# Patient Record
Sex: FEMALE | Race: WHITE | NOT HISPANIC OR LATINO | Employment: FULL TIME | URBAN - METROPOLITAN AREA
[De-identification: names, ages, dates, MRNs, and addresses within clinical notes are randomized per-mention and may not be internally consistent; named-entity substitution may affect disease eponyms.]

---

## 2021-11-10 ENCOUNTER — LAB VISIT (OUTPATIENT)
Dept: LAB | Facility: HOSPITAL | Age: 31
End: 2021-11-10
Attending: FAMILY MEDICINE
Payer: COMMERCIAL

## 2021-11-10 ENCOUNTER — OFFICE VISIT (OUTPATIENT)
Dept: INTERNAL MEDICINE | Facility: CLINIC | Age: 31
End: 2021-11-10
Payer: COMMERCIAL

## 2021-11-10 VITALS
SYSTOLIC BLOOD PRESSURE: 130 MMHG | TEMPERATURE: 98 F | BODY MASS INDEX: 28.23 KG/M2 | HEIGHT: 66 IN | WEIGHT: 175.69 LBS | DIASTOLIC BLOOD PRESSURE: 84 MMHG | HEART RATE: 64 BPM

## 2021-11-10 DIAGNOSIS — Z00.00 ANNUAL PHYSICAL EXAM: Primary | ICD-10-CM

## 2021-11-10 DIAGNOSIS — Z00.00 ANNUAL PHYSICAL EXAM: ICD-10-CM

## 2021-11-10 LAB
BASOPHILS # BLD AUTO: 0.03 K/UL (ref 0–0.2)
BASOPHILS NFR BLD: 0.5 % (ref 0–1.9)
DIFFERENTIAL METHOD: NORMAL
EOSINOPHIL # BLD AUTO: 0.1 K/UL (ref 0–0.5)
EOSINOPHIL NFR BLD: 1.7 % (ref 0–8)
ERYTHROCYTE [DISTWIDTH] IN BLOOD BY AUTOMATED COUNT: 12.5 % (ref 11.5–14.5)
ESTIMATED AVG GLUCOSE: 94 MG/DL (ref 68–131)
HBA1C MFR BLD: 4.9 % (ref 4–5.6)
HCT VFR BLD AUTO: 38.7 % (ref 37–48.5)
HGB BLD-MCNC: 12.8 G/DL (ref 12–16)
IMM GRANULOCYTES # BLD AUTO: 0.02 K/UL (ref 0–0.04)
IMM GRANULOCYTES NFR BLD AUTO: 0.3 % (ref 0–0.5)
LYMPHOCYTES # BLD AUTO: 2.9 K/UL (ref 1–4.8)
LYMPHOCYTES NFR BLD: 44.5 % (ref 18–48)
MCH RBC QN AUTO: 30.8 PG (ref 27–31)
MCHC RBC AUTO-ENTMCNC: 33.1 G/DL (ref 32–36)
MCV RBC AUTO: 93 FL (ref 82–98)
MONOCYTES # BLD AUTO: 0.6 K/UL (ref 0.3–1)
MONOCYTES NFR BLD: 8.6 % (ref 4–15)
NEUTROPHILS # BLD AUTO: 2.9 K/UL (ref 1.8–7.7)
NEUTROPHILS NFR BLD: 44.4 % (ref 38–73)
NRBC BLD-RTO: 0 /100 WBC
PLATELET # BLD AUTO: 283 K/UL (ref 150–450)
PMV BLD AUTO: 11.4 FL (ref 9.2–12.9)
RBC # BLD AUTO: 4.16 M/UL (ref 4–5.4)
WBC # BLD AUTO: 6.54 K/UL (ref 3.9–12.7)

## 2021-11-10 PROCEDURE — 90715 TDAP VACCINE GREATER THAN OR EQUAL TO 7YO IM: ICD-10-PCS | Mod: S$GLB,,, | Performed by: FAMILY MEDICINE

## 2021-11-10 PROCEDURE — 84443 ASSAY THYROID STIM HORMONE: CPT | Performed by: FAMILY MEDICINE

## 2021-11-10 PROCEDURE — 80053 COMPREHEN METABOLIC PANEL: CPT | Performed by: FAMILY MEDICINE

## 2021-11-10 PROCEDURE — 1159F MED LIST DOCD IN RCRD: CPT | Mod: CPTII,S$GLB,, | Performed by: FAMILY MEDICINE

## 2021-11-10 PROCEDURE — 99999 PR PBB SHADOW E&M-NEW PATIENT-LVL III: CPT | Mod: PBBFAC,,, | Performed by: FAMILY MEDICINE

## 2021-11-10 PROCEDURE — 85025 COMPLETE CBC W/AUTO DIFF WBC: CPT | Performed by: FAMILY MEDICINE

## 2021-11-10 PROCEDURE — 90471 IMMUNIZATION ADMIN: CPT | Mod: S$GLB,,, | Performed by: FAMILY MEDICINE

## 2021-11-10 PROCEDURE — 99385 PR PREVENTIVE VISIT,NEW,18-39: ICD-10-PCS | Mod: 25,S$GLB,, | Performed by: FAMILY MEDICINE

## 2021-11-10 PROCEDURE — 86803 HEPATITIS C AB TEST: CPT | Performed by: FAMILY MEDICINE

## 2021-11-10 PROCEDURE — 36415 COLL VENOUS BLD VENIPUNCTURE: CPT | Mod: PO | Performed by: FAMILY MEDICINE

## 2021-11-10 PROCEDURE — 99385 PREV VISIT NEW AGE 18-39: CPT | Mod: 25,S$GLB,, | Performed by: FAMILY MEDICINE

## 2021-11-10 PROCEDURE — 99999 PR PBB SHADOW E&M-NEW PATIENT-LVL III: ICD-10-PCS | Mod: PBBFAC,,, | Performed by: FAMILY MEDICINE

## 2021-11-10 PROCEDURE — 84466 ASSAY OF TRANSFERRIN: CPT | Performed by: FAMILY MEDICINE

## 2021-11-10 PROCEDURE — 1159F PR MEDICATION LIST DOCUMENTED IN MEDICAL RECORD: ICD-10-PCS | Mod: CPTII,S$GLB,, | Performed by: FAMILY MEDICINE

## 2021-11-10 PROCEDURE — 82728 ASSAY OF FERRITIN: CPT | Performed by: FAMILY MEDICINE

## 2021-11-10 PROCEDURE — 3075F PR MOST RECENT SYSTOLIC BLOOD PRESS GE 130-139MM HG: ICD-10-PCS | Mod: CPTII,S$GLB,, | Performed by: FAMILY MEDICINE

## 2021-11-10 PROCEDURE — 3075F SYST BP GE 130 - 139MM HG: CPT | Mod: CPTII,S$GLB,, | Performed by: FAMILY MEDICINE

## 2021-11-10 PROCEDURE — 90471 TDAP VACCINE GREATER THAN OR EQUAL TO 7YO IM: ICD-10-PCS | Mod: S$GLB,,, | Performed by: FAMILY MEDICINE

## 2021-11-10 PROCEDURE — 3008F PR BODY MASS INDEX (BMI) DOCUMENTED: ICD-10-PCS | Mod: CPTII,S$GLB,, | Performed by: FAMILY MEDICINE

## 2021-11-10 PROCEDURE — 3079F DIAST BP 80-89 MM HG: CPT | Mod: CPTII,S$GLB,, | Performed by: FAMILY MEDICINE

## 2021-11-10 PROCEDURE — 83525 ASSAY OF INSULIN: CPT | Performed by: FAMILY MEDICINE

## 2021-11-10 PROCEDURE — 3079F PR MOST RECENT DIASTOLIC BLOOD PRESSURE 80-89 MM HG: ICD-10-PCS | Mod: CPTII,S$GLB,, | Performed by: FAMILY MEDICINE

## 2021-11-10 PROCEDURE — 87389 HIV-1 AG W/HIV-1&-2 AB AG IA: CPT | Performed by: FAMILY MEDICINE

## 2021-11-10 PROCEDURE — 3008F BODY MASS INDEX DOCD: CPT | Mod: CPTII,S$GLB,, | Performed by: FAMILY MEDICINE

## 2021-11-10 PROCEDURE — 83036 HEMOGLOBIN GLYCOSYLATED A1C: CPT | Performed by: FAMILY MEDICINE

## 2021-11-10 PROCEDURE — 90715 TDAP VACCINE 7 YRS/> IM: CPT | Mod: S$GLB,,, | Performed by: FAMILY MEDICINE

## 2021-11-10 PROCEDURE — 80061 LIPID PANEL: CPT | Performed by: FAMILY MEDICINE

## 2021-11-10 RX ORDER — ZINC SULFATE 66 MG
1 TABLET ORAL DAILY
COMMUNITY

## 2021-11-10 RX ORDER — IBUPROFEN 200 MG
200 TABLET ORAL EVERY 6 HOURS PRN
COMMUNITY

## 2021-11-10 RX ORDER — AMOXICILLIN 500 MG
CAPSULE ORAL DAILY
COMMUNITY

## 2021-11-10 RX ORDER — FERROUS SULFATE 325(65) MG
325 TABLET, DELAYED RELEASE (ENTERIC COATED) ORAL DAILY
COMMUNITY

## 2021-11-10 RX ORDER — MULTIVITAMIN
1 TABLET ORAL DAILY
COMMUNITY

## 2021-11-10 RX ORDER — UBIDECARENONE 75 MG
500 CAPSULE ORAL DAILY
COMMUNITY

## 2021-11-11 LAB
ALBUMIN SERPL BCP-MCNC: 4.4 G/DL (ref 3.5–5.2)
ALP SERPL-CCNC: 53 U/L (ref 55–135)
ALT SERPL W/O P-5'-P-CCNC: 21 U/L (ref 10–44)
ANION GAP SERPL CALC-SCNC: 9 MMOL/L (ref 8–16)
AST SERPL-CCNC: 30 U/L (ref 10–40)
BILIRUB SERPL-MCNC: 0.6 MG/DL (ref 0.1–1)
BUN SERPL-MCNC: 18 MG/DL (ref 6–20)
CALCIUM SERPL-MCNC: 10.2 MG/DL (ref 8.7–10.5)
CHLORIDE SERPL-SCNC: 108 MMOL/L (ref 95–110)
CHOLEST SERPL-MCNC: 268 MG/DL (ref 120–199)
CHOLEST/HDLC SERPL: 4.3 {RATIO} (ref 2–5)
CO2 SERPL-SCNC: 23 MMOL/L (ref 23–29)
CREAT SERPL-MCNC: 0.8 MG/DL (ref 0.5–1.4)
EST. GFR  (AFRICAN AMERICAN): >60 ML/MIN/1.73 M^2
EST. GFR  (NON AFRICAN AMERICAN): >60 ML/MIN/1.73 M^2
FERRITIN SERPL-MCNC: 130 NG/ML (ref 20–300)
GLUCOSE SERPL-MCNC: 84 MG/DL (ref 70–110)
HCV AB SERPL QL IA: NEGATIVE
HDLC SERPL-MCNC: 62 MG/DL (ref 40–75)
HDLC SERPL: 23.1 % (ref 20–50)
HIV 1+2 AB+HIV1 P24 AG SERPL QL IA: NEGATIVE
INSULIN COLLECTION INTERVAL: NORMAL
INSULIN SERPL-ACNC: 4.7 UU/ML
IRON SERPL-MCNC: 145 UG/DL (ref 30–160)
LDLC SERPL CALC-MCNC: 188.2 MG/DL (ref 63–159)
NONHDLC SERPL-MCNC: 206 MG/DL
POTASSIUM SERPL-SCNC: 4.6 MMOL/L (ref 3.5–5.1)
PROT SERPL-MCNC: 8 G/DL (ref 6–8.4)
SATURATED IRON: 38 % (ref 20–50)
SODIUM SERPL-SCNC: 140 MMOL/L (ref 136–145)
TOTAL IRON BINDING CAPACITY: 385 UG/DL (ref 250–450)
TRANSFERRIN SERPL-MCNC: 260 MG/DL (ref 200–375)
TRIGL SERPL-MCNC: 89 MG/DL (ref 30–150)
TSH SERPL DL<=0.005 MIU/L-ACNC: 2.08 UIU/ML (ref 0.4–4)

## 2021-12-06 ENCOUNTER — OFFICE VISIT (OUTPATIENT)
Dept: URGENT CARE | Facility: CLINIC | Age: 31
End: 2021-12-06
Payer: COMMERCIAL

## 2021-12-06 VITALS
HEART RATE: 61 BPM | DIASTOLIC BLOOD PRESSURE: 61 MMHG | BODY MASS INDEX: 28.12 KG/M2 | HEIGHT: 66 IN | TEMPERATURE: 98 F | RESPIRATION RATE: 16 BRPM | SYSTOLIC BLOOD PRESSURE: 116 MMHG | WEIGHT: 175 LBS | OXYGEN SATURATION: 98 %

## 2021-12-06 DIAGNOSIS — R05.9 COUGH: Primary | ICD-10-CM

## 2021-12-06 LAB
CTP QC/QA: YES
SARS-COV-2 RDRP RESP QL NAA+PROBE: NEGATIVE

## 2021-12-06 PROCEDURE — 99214 PR OFFICE/OUTPT VISIT, EST, LEVL IV, 30-39 MIN: ICD-10-PCS | Mod: S$GLB,,, | Performed by: NURSE PRACTITIONER

## 2021-12-06 PROCEDURE — 99214 OFFICE O/P EST MOD 30 MIN: CPT | Mod: S$GLB,,, | Performed by: NURSE PRACTITIONER

## 2021-12-06 PROCEDURE — U0002 COVID-19 LAB TEST NON-CDC: HCPCS | Mod: QW,S$GLB,, | Performed by: NURSE PRACTITIONER

## 2021-12-06 PROCEDURE — U0002: ICD-10-PCS | Mod: QW,S$GLB,, | Performed by: NURSE PRACTITIONER

## 2021-12-09 ENCOUNTER — TELEPHONE (OUTPATIENT)
Dept: URGENT CARE | Facility: CLINIC | Age: 31
End: 2021-12-09
Payer: COMMERCIAL

## 2022-03-02 ENCOUNTER — TELEPHONE (OUTPATIENT)
Dept: INTERNAL MEDICINE | Facility: CLINIC | Age: 32
End: 2022-03-02
Payer: COMMERCIAL

## 2022-03-02 LAB
ALBUMIN SERPL BCP-MCNC: 4.7 G/DL (ref 3.4–5)
ALP SERPL-CCNC: 67 U/L (ref 45–117)
ALT SERPL W P-5'-P-CCNC: 62 U/L (ref 13–56)
ANION GAP SERPL CALC-SCNC: 10 MMOL/L (ref 3–11)
AST SERPL-CCNC: 50 U/L (ref 10–37)
BILIRUB SERPL-MCNC: 0.7 MG/DL (ref 0.2–1)
BUN SERPL-MCNC: 16 MG/DL (ref 7–18)
BUN/CREAT SERPL: 20 (ref 7–21)
CALCIUM SERPL-MCNC: 10.4 MG/DL (ref 8.5–10.1)
CHLORIDE SERPL-SCNC: 99 MMOL/L (ref 98–107)
CO2 SERPL-SCNC: 28 MMOL/L (ref 21–32)
CREAT SERPL-MCNC: 0.8 MG/DL (ref 0.6–1)
EST. GFR  (AFRICAN AMERICAN): 116 ML/MIN/1.73 M2 (ref 60–120)
EST. GFR  (NON AFRICAN AMERICAN): 100 ML/MIN/1.73 M2 (ref 60–120)
GLUCOSE SERPL-MCNC: 88 MG/DL (ref 74–154)
LIPASE: 192 (ref 73–330)
POTASSIUM SERPL-SCNC: 3.8 MMOL/L (ref 3.5–5.1)
PROT SNV-MCNC: 8.7 G/L (ref 6.4–8.6)
SODIUM BLD-SCNC: 137 MMOL/L (ref 136–145)

## 2022-03-02 NOTE — TELEPHONE ENCOUNTER
----- Message from Yareli Pabon sent at 3/2/2022 12:33 PM CST -----  Pt is requesting a call back ion regards to getting an sooner appt for bad stomach pain. Pt can be reached at [phone] (ffsf)

## 2022-03-02 NOTE — TELEPHONE ENCOUNTER
Spoke with pt and scheduled her an appt with araceli on next Tuesday. I also advised pt if stomach pain was too much to handle, to go to the nearest urgent care.

## 2022-03-03 ENCOUNTER — PATIENT MESSAGE (OUTPATIENT)
Dept: INTERNAL MEDICINE | Facility: CLINIC | Age: 32
End: 2022-03-03
Payer: COMMERCIAL

## 2022-03-04 ENCOUNTER — OFFICE VISIT (OUTPATIENT)
Dept: INTERNAL MEDICINE | Facility: CLINIC | Age: 32
End: 2022-03-04
Payer: COMMERCIAL

## 2022-03-04 ENCOUNTER — LAB VISIT (OUTPATIENT)
Dept: LAB | Facility: HOSPITAL | Age: 32
End: 2022-03-04
Attending: PHYSICIAN ASSISTANT
Payer: COMMERCIAL

## 2022-03-04 VITALS
BODY MASS INDEX: 27.67 KG/M2 | SYSTOLIC BLOOD PRESSURE: 120 MMHG | TEMPERATURE: 98 F | DIASTOLIC BLOOD PRESSURE: 70 MMHG | WEIGHT: 168.88 LBS | HEART RATE: 74 BPM | OXYGEN SATURATION: 99 %

## 2022-03-04 DIAGNOSIS — R19.5 LOOSE STOOLS: ICD-10-CM

## 2022-03-04 DIAGNOSIS — Z83.710 FAMILY HISTORY OF FAP (FAMILIAL ADENOMATOUS POLYPOSIS): ICD-10-CM

## 2022-03-04 DIAGNOSIS — R10.9 STOMACH PAIN: ICD-10-CM

## 2022-03-04 DIAGNOSIS — Z83.710 FAMILY HISTORY OF FAP (FAMILIAL ADENOMATOUS POLYPOSIS): Primary | ICD-10-CM

## 2022-03-04 LAB
BASOPHILS # BLD AUTO: 0.04 K/UL (ref 0–0.2)
BASOPHILS NFR BLD: 0.4 % (ref 0–1.9)
DIFFERENTIAL METHOD: ABNORMAL
EOSINOPHIL # BLD AUTO: 1.5 K/UL (ref 0–0.5)
EOSINOPHIL NFR BLD: 15.1 % (ref 0–8)
ERYTHROCYTE [DISTWIDTH] IN BLOOD BY AUTOMATED COUNT: 12.3 % (ref 11.5–14.5)
HCT VFR BLD AUTO: 40.9 % (ref 37–48.5)
HGB BLD-MCNC: 13.5 G/DL (ref 12–16)
IMM GRANULOCYTES # BLD AUTO: 0.01 K/UL (ref 0–0.04)
IMM GRANULOCYTES NFR BLD AUTO: 0.1 % (ref 0–0.5)
LYMPHOCYTES # BLD AUTO: 3.1 K/UL (ref 1–4.8)
LYMPHOCYTES NFR BLD: 30.5 % (ref 18–48)
MCH RBC QN AUTO: 30.6 PG (ref 27–31)
MCHC RBC AUTO-ENTMCNC: 33 G/DL (ref 32–36)
MCV RBC AUTO: 93 FL (ref 82–98)
MONOCYTES # BLD AUTO: 0.6 K/UL (ref 0.3–1)
MONOCYTES NFR BLD: 6.3 % (ref 4–15)
NEUTROPHILS # BLD AUTO: 4.8 K/UL (ref 1.8–7.7)
NEUTROPHILS NFR BLD: 47.6 % (ref 38–73)
NRBC BLD-RTO: 0 /100 WBC
PLATELET # BLD AUTO: 253 K/UL (ref 150–450)
PMV BLD AUTO: 11.6 FL (ref 9.2–12.9)
RBC # BLD AUTO: 4.41 M/UL (ref 4–5.4)
WBC # BLD AUTO: 10.13 K/UL (ref 3.9–12.7)

## 2022-03-04 PROCEDURE — 3078F DIAST BP <80 MM HG: CPT | Mod: CPTII,S$GLB,, | Performed by: PHYSICIAN ASSISTANT

## 2022-03-04 PROCEDURE — 36415 COLL VENOUS BLD VENIPUNCTURE: CPT | Mod: PO | Performed by: PHYSICIAN ASSISTANT

## 2022-03-04 PROCEDURE — 1159F PR MEDICATION LIST DOCUMENTED IN MEDICAL RECORD: ICD-10-PCS | Mod: CPTII,S$GLB,, | Performed by: PHYSICIAN ASSISTANT

## 2022-03-04 PROCEDURE — 3008F PR BODY MASS INDEX (BMI) DOCUMENTED: ICD-10-PCS | Mod: CPTII,S$GLB,, | Performed by: PHYSICIAN ASSISTANT

## 2022-03-04 PROCEDURE — 99999 PR PBB SHADOW E&M-EST. PATIENT-LVL III: ICD-10-PCS | Mod: PBBFAC,,, | Performed by: PHYSICIAN ASSISTANT

## 2022-03-04 PROCEDURE — 99999 PR PBB SHADOW E&M-EST. PATIENT-LVL III: CPT | Mod: PBBFAC,,, | Performed by: PHYSICIAN ASSISTANT

## 2022-03-04 PROCEDURE — 85025 COMPLETE CBC W/AUTO DIFF WBC: CPT | Performed by: PHYSICIAN ASSISTANT

## 2022-03-04 PROCEDURE — 3078F PR MOST RECENT DIASTOLIC BLOOD PRESSURE < 80 MM HG: ICD-10-PCS | Mod: CPTII,S$GLB,, | Performed by: PHYSICIAN ASSISTANT

## 2022-03-04 PROCEDURE — 99214 PR OFFICE/OUTPT VISIT, EST, LEVL IV, 30-39 MIN: ICD-10-PCS | Mod: S$GLB,,, | Performed by: PHYSICIAN ASSISTANT

## 2022-03-04 PROCEDURE — 1159F MED LIST DOCD IN RCRD: CPT | Mod: CPTII,S$GLB,, | Performed by: PHYSICIAN ASSISTANT

## 2022-03-04 PROCEDURE — 3074F PR MOST RECENT SYSTOLIC BLOOD PRESSURE < 130 MM HG: ICD-10-PCS | Mod: CPTII,S$GLB,, | Performed by: PHYSICIAN ASSISTANT

## 2022-03-04 PROCEDURE — 3008F BODY MASS INDEX DOCD: CPT | Mod: CPTII,S$GLB,, | Performed by: PHYSICIAN ASSISTANT

## 2022-03-04 PROCEDURE — 99214 OFFICE O/P EST MOD 30 MIN: CPT | Mod: S$GLB,,, | Performed by: PHYSICIAN ASSISTANT

## 2022-03-04 PROCEDURE — 3074F SYST BP LT 130 MM HG: CPT | Mod: CPTII,S$GLB,, | Performed by: PHYSICIAN ASSISTANT

## 2022-03-04 RX ORDER — PANTOPRAZOLE SODIUM 40 MG/1
40 TABLET, DELAYED RELEASE ORAL DAILY
COMMUNITY
Start: 2022-03-03

## 2022-03-04 RX ORDER — BETAMETHASONE SODIUM PHOSPHATE AND BETAMETHASONE ACETATE 3; 3 MG/ML; MG/ML
9 INJECTION, SUSPENSION INTRA-ARTICULAR; INTRALESIONAL; INTRAMUSCULAR; SOFT TISSUE ONCE
Status: DISCONTINUED | OUTPATIENT
Start: 2022-03-04 | End: 2022-03-04

## 2022-03-04 NOTE — PROGRESS NOTES
Subjective:       Patient ID: Crystal Cordero is a 31 y.o. female.    Chief Complaint: Abdominal Pain    HPI    Patient comes in today for ER follow up ABd pain   Last week had some significant abd pain and then 2 days ago went to ER due to pain  We do not have records at time of visit   Pain in upper right side at times but mostly middle abdomen     States US of gallbladder was normal but blood work showed elevated eosinophils?   She started protonix this AM   Has loose stools x 2-3 days   thinking her mom has FAP- gets yearly colon ca screen   Maternal grandfather with CRC    Health Maintenance Due   Topic Date Due    COVID-19 Vaccine (1) Never done    Cervical Cancer Screening  08/22/2020       Past Medical History:   Diagnosis Date    Dyslexia        Current Outpatient Medications   Medication Sig Dispense Refill    multivitamin (THERAGRAN) per tablet Take 1 tablet by mouth once daily.      omega-3 fatty acids/fish oil (FISH OIL-OMEGA-3 FATTY ACIDS) 300-1,000 mg capsule Take by mouth once daily.      pantoprazole (PROTONIX) 40 MG tablet Take 40 mg by mouth once daily.      zinc sulfate (ZINC-15) 66 mg Tab Take 1 tablet by mouth once daily.      cyanocobalamin 500 MCG tablet Take 500 mcg by mouth once daily.      ferrous sulfate 325 (65 FE) MG EC tablet Take 325 mg by mouth once daily.      ibuprofen (ADVIL,MOTRIN) 200 MG tablet Take 200 mg by mouth every 6 (six) hours as needed for Pain.       No current facility-administered medications for this visit.       Review of Systems   Constitutional: Negative for fatigue, fever and unexpected weight change.   HENT: Negative for sore throat and trouble swallowing.    Respiratory: Negative for cough and shortness of breath.    Cardiovascular: Negative for chest pain.   Gastrointestinal: Positive for abdominal distention, abdominal pain and diarrhea.   Skin: Negative for color change, pallor, rash and wound.   Hematological: Negative for adenopathy. Does  not bruise/bleed easily.   All other systems reviewed and are negative.      Objective:   /70   Pulse 74   Temp 97.9 °F (36.6 °C)   Wt 76.6 kg (168 lb 14 oz)   LMP 02/18/2022   SpO2 99%   BMI 27.67 kg/m²      Physical Exam  Constitutional:       Appearance: Normal appearance.   HENT:      Head: Normocephalic and atraumatic.      Nose: Nose normal.      Mouth/Throat:      Mouth: Mucous membranes are moist.   Eyes:      Pupils: Pupils are equal, round, and reactive to light.   Cardiovascular:      Rate and Rhythm: Normal rate.   Pulmonary:      Effort: Pulmonary effort is normal.   Abdominal:      General: Abdomen is flat. Bowel sounds are normal. There is no distension.      Palpations: Abdomen is soft.      Tenderness: There is no abdominal tenderness.   Neurological:      General: No focal deficit present.      Mental Status: She is alert and oriented to person, place, and time.   Psychiatric:         Mood and Affect: Mood normal.         Behavior: Behavior normal.         Thought Content: Thought content normal.           Lab Results   Component Value Date    WBC 6.54 11/10/2021    HGB 12.8 11/10/2021    HCT 38.7 11/10/2021     11/10/2021    CHOL 268 (H) 11/10/2021    TRIG 89 11/10/2021    HDL 62 11/10/2021    ALT 21 11/10/2021    AST 30 11/10/2021     11/10/2021    K 4.6 11/10/2021     11/10/2021    CREATININE 0.8 11/10/2021    BUN 18 11/10/2021    CO2 23 11/10/2021    TSH 2.085 11/10/2021    HGBA1C 4.9 11/10/2021       Assessment:       1. Family history of FAP (familial adenomatous polyposis)    2. Stomach pain    3. Loose stools        Plan:   Family history of FAP (familial adenomatous polyposis)  -     CBC Auto Differential; Future; Expected date: 03/04/2022  -     Comprehensive Metabolic Panel; Future; Expected date: 09/04/2022  -     Ambulatory referral/consult to Gastroenterology; Future; Expected date: 03/11/2022  -     Case Request Endoscopy: COLONOSCOPY    Stomach pain  -      Ambulatory referral/consult to Gastroenterology; Future; Expected date: 03/11/2022  -     Case Request Endoscopy: COLONOSCOPY    Loose stools  -     Case Request Endoscopy: COLONOSCOPY        Start Tagemet for GERD symptoms  [place] diet   Repeat labs to see what the elevation was - sending request for records   And get to GI

## 2022-03-07 ENCOUNTER — PATIENT MESSAGE (OUTPATIENT)
Dept: INTERNAL MEDICINE | Facility: CLINIC | Age: 32
End: 2022-03-07
Payer: COMMERCIAL

## 2022-03-10 ENCOUNTER — PATIENT OUTREACH (OUTPATIENT)
Dept: ADMINISTRATIVE | Facility: HOSPITAL | Age: 32
End: 2022-03-10
Payer: COMMERCIAL

## 2022-03-15 ENCOUNTER — OFFICE VISIT (OUTPATIENT)
Dept: GASTROENTEROLOGY | Facility: CLINIC | Age: 32
End: 2022-03-15
Payer: COMMERCIAL

## 2022-03-15 VITALS
WEIGHT: 170 LBS | HEIGHT: 65 IN | DIASTOLIC BLOOD PRESSURE: 80 MMHG | BODY MASS INDEX: 28.32 KG/M2 | HEART RATE: 64 BPM | SYSTOLIC BLOOD PRESSURE: 138 MMHG

## 2022-03-15 DIAGNOSIS — Z83.710 FAMILY HISTORY OF FAP (FAMILIAL ADENOMATOUS POLYPOSIS): ICD-10-CM

## 2022-03-15 DIAGNOSIS — R10.33 PERIUMBILICAL ABDOMINAL PAIN: ICD-10-CM

## 2022-03-15 DIAGNOSIS — R10.9 STOMACH PAIN: ICD-10-CM

## 2022-03-15 PROCEDURE — 3075F PR MOST RECENT SYSTOLIC BLOOD PRESS GE 130-139MM HG: ICD-10-PCS | Mod: CPTII,S$GLB,, | Performed by: INTERNAL MEDICINE

## 2022-03-15 PROCEDURE — 3079F PR MOST RECENT DIASTOLIC BLOOD PRESSURE 80-89 MM HG: ICD-10-PCS | Mod: CPTII,S$GLB,, | Performed by: INTERNAL MEDICINE

## 2022-03-15 PROCEDURE — 99204 PR OFFICE/OUTPT VISIT, NEW, LEVL IV, 45-59 MIN: ICD-10-PCS | Mod: S$GLB,,, | Performed by: INTERNAL MEDICINE

## 2022-03-15 PROCEDURE — 1159F PR MEDICATION LIST DOCUMENTED IN MEDICAL RECORD: ICD-10-PCS | Mod: CPTII,S$GLB,, | Performed by: INTERNAL MEDICINE

## 2022-03-15 PROCEDURE — 99204 OFFICE O/P NEW MOD 45 MIN: CPT | Mod: S$GLB,,, | Performed by: INTERNAL MEDICINE

## 2022-03-15 PROCEDURE — 1160F RVW MEDS BY RX/DR IN RCRD: CPT | Mod: CPTII,S$GLB,, | Performed by: INTERNAL MEDICINE

## 2022-03-15 PROCEDURE — 1160F PR REVIEW ALL MEDS BY PRESCRIBER/CLIN PHARMACIST DOCUMENTED: ICD-10-PCS | Mod: CPTII,S$GLB,, | Performed by: INTERNAL MEDICINE

## 2022-03-15 PROCEDURE — 3075F SYST BP GE 130 - 139MM HG: CPT | Mod: CPTII,S$GLB,, | Performed by: INTERNAL MEDICINE

## 2022-03-15 PROCEDURE — 3008F PR BODY MASS INDEX (BMI) DOCUMENTED: ICD-10-PCS | Mod: CPTII,S$GLB,, | Performed by: INTERNAL MEDICINE

## 2022-03-15 PROCEDURE — 99999 PR PBB SHADOW E&M-EST. PATIENT-LVL IV: ICD-10-PCS | Mod: PBBFAC,,, | Performed by: INTERNAL MEDICINE

## 2022-03-15 PROCEDURE — 3079F DIAST BP 80-89 MM HG: CPT | Mod: CPTII,S$GLB,, | Performed by: INTERNAL MEDICINE

## 2022-03-15 PROCEDURE — 99999 PR PBB SHADOW E&M-EST. PATIENT-LVL IV: CPT | Mod: PBBFAC,,, | Performed by: INTERNAL MEDICINE

## 2022-03-15 PROCEDURE — 3008F BODY MASS INDEX DOCD: CPT | Mod: CPTII,S$GLB,, | Performed by: INTERNAL MEDICINE

## 2022-03-15 PROCEDURE — 1159F MED LIST DOCD IN RCRD: CPT | Mod: CPTII,S$GLB,, | Performed by: INTERNAL MEDICINE

## 2022-03-15 NOTE — PROGRESS NOTES
"Clinic Consult:  [facility] Consultation Note    Reason for Consult:  Diagnoses of Family history of FAP (familial adenomatous polyposis), Stomach pain, and Periumbilical abdominal pain were pertinent to this visit.    PCP: Marito Leonard   [id number] [facility] / [id number] [id number]    HPI:  This is a 31 y.o. female here for evaluation of abdominal pain.   About two weeks ago, she began to experience periumbilical abdominal pain.   It felt like a "twisting pain".   This was accompanied with loose stools.   She was evaluated in the ED.   She had an US abdomen that was normal.   She had elevation in WBC.     She was diagnosed with GERD and started on PPI.   She was started on a bland diet by PCP.     Stool is now normal.     Maternal grandfather with colon cancer.   Mom with colon polyps.   No family history of FAP.    ROS:  Review of Systems   Constitutional: Negative for unexpected weight change.   HENT: Negative for trouble swallowing.    Gastrointestinal: Positive for abdominal pain. Negative for abdominal distention, anal bleeding, blood in stool, constipation, diarrhea, nausea, rectal pain and vomiting.           Medical History:   Past Medical History:   Diagnosis Date    Dyslexia        Surgical History:  Past Surgical History:   Procedure Laterality Date    KNEE ARTHROSCOPY W/ ACL RECONSTRUCTION Left     KNEE CARTILAGE SURGERY Left     MEDIAL COLLATERAL LIGAMENT REPAIR, KNEE Left     TONSILLECTOMY         Family History:   Family History   Problem Relation Age of Onset    Hyperlipidemia Mother     Hypertension Mother     Diabetes Mother     Diabetes Maternal Grandmother     Hypertension Maternal Grandmother     Hyperlipidemia Maternal Grandmother     Diverticulitis Maternal Grandmother     Heart disease Maternal Grandfather     Hypertension Maternal Grandfather     Colon cancer Maternal Grandfather     Breast cancer Neg Hx        Social History:   Social History     Tobacco " "Use    Smoking status: Never Smoker    Smokeless tobacco: Never Used   Substance Use Topics    Alcohol use: Yes     Comment: Occ/social    Drug use: Never       Allergies: Reviewed    Home Medications:   Medication List with Changes/Refills   Current Medications    CYANOCOBALAMIN 500 MCG TABLET    Take 500 mcg by mouth once daily.    FERROUS SULFATE 325 (65 FE) MG EC TABLET    Take 325 mg by mouth once daily.    IBUPROFEN (ADVIL,MOTRIN) 200 MG TABLET    Take 200 mg by mouth every 6 (six) hours as needed for Pain.    MULTIVITAMIN (THERAGRAN) PER TABLET    Take 1 tablet by mouth once daily.    OMEGA-3 FATTY ACIDS/FISH OIL (FISH OIL-OMEGA-3 FATTY ACIDS) 300-1,000 MG CAPSULE    Take by mouth once daily.    PANTOPRAZOLE (PROTONIX) 40 MG TABLET    Take 40 mg by mouth once daily.    ZINC SULFATE (ZINC-15) 66 MG TAB    Take 1 tablet by mouth once daily.         Physical Exam:  Vital Signs:  /80 (BP Location: Right arm, Patient Position: Sitting, BP Method: Medium (Manual))   Pulse 64   Ht 5' 5" (1.651 m)   Wt 77.1 kg (169 lb 15.6 oz)   LMP 03/05/2022 (Approximate)   BMI 28.29 kg/m²   Body mass index is 28.29 kg/m².    Physical Exam  Constitutional:       Appearance: Normal appearance.   Eyes:      Conjunctiva/sclera: Conjunctivae normal.   Abdominal:      General: There is no distension.      Palpations: Abdomen is soft.      Tenderness: There is no abdominal tenderness. There is no guarding.   Neurological:      Mental Status: She is alert.          Labs: Pertinent labs reviewed.      Assessment:  Problem List Items Addressed This Visit    None     Visit Diagnoses         Stomach pain            Periumbilical Abdominal pain         Plan:   -Suspect pain may be related to GERD  -Continue PPI daily   -Avoid trigger foods   -GERD lifestyle modifications   -If symptoms do not improve in a few weeks, we will plan for an EGD          Follow up in about 4 weeks (around 4/12/2022).    Order summary:  No orders of " the defined types were placed in this encounter.      Thank you so much for allowing me to participate in the care of Crystal Chandler MD  Gastroenterology and Hepatology  [facility]

## 2022-03-16 PROBLEM — R10.33 PERIUMBILICAL ABDOMINAL PAIN: Status: ACTIVE | Noted: 2022-03-16

## 2022-03-16 NOTE — ASSESSMENT & PLAN NOTE
Plan:   -Suspect pain may be related to GERD  -Continue PPI daily   -Avoid trigger foods   -GERD lifestyle modifications   -If symptoms do not improve in a few weeks, we will plan for an EGD

## 2022-04-05 ENCOUNTER — OFFICE VISIT (OUTPATIENT)
Dept: GASTROENTEROLOGY | Facility: CLINIC | Age: 32
End: 2022-04-05
Payer: COMMERCIAL

## 2022-04-05 ENCOUNTER — TELEPHONE (OUTPATIENT)
Dept: ADMINISTRATIVE | Facility: HOSPITAL | Age: 32
End: 2022-04-05
Payer: COMMERCIAL

## 2022-04-05 DIAGNOSIS — Z83.710 FAMILY HISTORY OF FAP (FAMILIAL ADENOMATOUS POLYPOSIS): ICD-10-CM

## 2022-04-05 DIAGNOSIS — R19.5 LOOSE STOOLS: Primary | ICD-10-CM

## 2022-04-05 DIAGNOSIS — R10.9 STOMACH PAIN: ICD-10-CM

## 2022-04-05 DIAGNOSIS — R10.33 PERIUMBILICAL ABDOMINAL PAIN: Primary | ICD-10-CM

## 2022-04-05 PROCEDURE — 1159F PR MEDICATION LIST DOCUMENTED IN MEDICAL RECORD: ICD-10-PCS | Mod: CPTII,95,, | Performed by: INTERNAL MEDICINE

## 2022-04-05 PROCEDURE — 1160F PR REVIEW ALL MEDS BY PRESCRIBER/CLIN PHARMACIST DOCUMENTED: ICD-10-PCS | Mod: CPTII,95,, | Performed by: INTERNAL MEDICINE

## 2022-04-05 PROCEDURE — 1160F RVW MEDS BY RX/DR IN RCRD: CPT | Mod: CPTII,95,, | Performed by: INTERNAL MEDICINE

## 2022-04-05 PROCEDURE — 99213 PR OFFICE/OUTPT VISIT, EST, LEVL III, 20-29 MIN: ICD-10-PCS | Mod: 95,,, | Performed by: INTERNAL MEDICINE

## 2022-04-05 PROCEDURE — 1159F MED LIST DOCD IN RCRD: CPT | Mod: CPTII,95,, | Performed by: INTERNAL MEDICINE

## 2022-04-05 PROCEDURE — 99213 OFFICE O/P EST LOW 20 MIN: CPT | Mod: 95,,, | Performed by: INTERNAL MEDICINE

## 2022-04-05 NOTE — PROGRESS NOTES
"Clinic Progress Note:  [facility] Progress Note    Reason for Visit:  There were no encounter diagnoses.    PCP: Marito Leonard       Visit type: audiovisual      20 minutes of total time spent on the encounter, which includes face to face time and non-face to face time preparing to see the patient (eg, review of tests), Obtaining and/or reviewing separately obtained history, Documenting clinical information in the electronic or other health record, Independently interpreting results (not separately reported) and communicating results to the patient/family/caregiver, or Care coordination (not separately reported).   Each patient to whom he or she provides medical services by telemedicine is: (1) informed of the relationship between the physician and patient and the respective role of any other health care provider with respect to management of the patient; and (2) notified that he or she may decline to receive medical services by telemedicine and may withdraw from such care at any time.        HPI:    This is a 31 y.o. female here for evaluation of abdominal pain.   A few weeks ago, she began to experience periumbilical abdominal pain.   It felt like a "twisting pain".   This was accompanied with loose stools.   She was evaluated in the ED.   She had an US abdomen that was normal.   She had elevation in WBC.      She was diagnosed with GERD and started on PPI.   She was started on a bland diet by PCP.      Stool was normal at last visit.      Maternal grandfather with colon cancer.   Mom with colon polyps.   No family history of FAP.    Since last visit, she completed PPI.   She has not had any more episodes of abdominal pain for the last two weeks.   She is now consuming a normal diet.      ROS:  Review of Systems   Constitutional: Negative for unexpected weight change.   HENT: Negative for trouble swallowing.    Gastrointestinal: Positive for abdominal pain. Negative for abdominal distention, " anal bleeding, blood in stool, constipation, diarrhea, nausea, rectal pain and vomiting.          Allergies: Reviewed    Home Medications:   Medication List with Changes/Refills   Current Medications    CYANOCOBALAMIN 500 MCG TABLET    Take 500 mcg by mouth once daily.    FERROUS SULFATE 325 (65 FE) MG EC TABLET    Take 325 mg by mouth once daily.    IBUPROFEN (ADVIL,MOTRIN) 200 MG TABLET    Take 200 mg by mouth every 6 (six) hours as needed for Pain.    MULTIVITAMIN (THERAGRAN) PER TABLET    Take 1 tablet by mouth once daily.    OMEGA-3 FATTY ACIDS/FISH OIL (FISH OIL-OMEGA-3 FATTY ACIDS) 300-1,000 MG CAPSULE    Take by mouth once daily.    PANTOPRAZOLE (PROTONIX) 40 MG TABLET    Take 40 mg by mouth once daily.    ZINC SULFATE (ZINC-15) 66 MG TAB    Take 1 tablet by mouth once daily.         Physical Exam:  Vital Signs:  There were no vitals taken for this visit.  There is no height or weight on file to calculate BMI.    Physical Exam  Vitals reviewed: virtual visit.          Labs: Pertinent labs reviewed.        Assessment:    Periumbilical Abdominal pain---> resolved             Plan:   Patient can return to clinic as needed   She will make us aware if symptoms return.            Follow up if symptoms worsen or fail to improve.    Order summary:  No orders of the defined types were placed in this encounter.      Thank you so much for allowing me to participate in the care of Crystal Chandler MD  Gastroenterology and Hepatology  [facility]

## 2022-05-02 ENCOUNTER — PATIENT MESSAGE (OUTPATIENT)
Dept: ADMINISTRATIVE | Facility: HOSPITAL | Age: 32
End: 2022-05-02
Payer: COMMERCIAL

## 2022-08-04 ENCOUNTER — PATIENT MESSAGE (OUTPATIENT)
Dept: ADMINISTRATIVE | Facility: HOSPITAL | Age: 32
End: 2022-08-04
Payer: COMMERCIAL

## 2023-01-25 ENCOUNTER — PATIENT MESSAGE (OUTPATIENT)
Dept: ADMINISTRATIVE | Facility: HOSPITAL | Age: 33
End: 2023-01-25
Payer: COMMERCIAL

## 2023-04-19 ENCOUNTER — PATIENT MESSAGE (OUTPATIENT)
Dept: ADMINISTRATIVE | Facility: HOSPITAL | Age: 33
End: 2023-04-19
Payer: COMMERCIAL

## 2023-05-17 ENCOUNTER — OFFICE VISIT (OUTPATIENT)
Dept: INTERNAL MEDICINE | Facility: CLINIC | Age: 33
End: 2023-05-17
Payer: COMMERCIAL

## 2023-05-17 ENCOUNTER — PATIENT MESSAGE (OUTPATIENT)
Dept: SPORTS MEDICINE | Facility: CLINIC | Age: 33
End: 2023-05-17
Payer: COMMERCIAL

## 2023-05-17 ENCOUNTER — NURSE TRIAGE (OUTPATIENT)
Dept: ADMINISTRATIVE | Facility: CLINIC | Age: 33
End: 2023-05-17
Payer: COMMERCIAL

## 2023-05-17 VITALS
BODY MASS INDEX: 29.82 KG/M2 | SYSTOLIC BLOOD PRESSURE: 130 MMHG | WEIGHT: 179 LBS | HEART RATE: 72 BPM | OXYGEN SATURATION: 99 % | TEMPERATURE: 98 F | DIASTOLIC BLOOD PRESSURE: 76 MMHG | HEIGHT: 65 IN

## 2023-05-17 DIAGNOSIS — M25.421 SWELLING OF JOINT OF UPPER ARM, RIGHT: Primary | ICD-10-CM

## 2023-05-17 DIAGNOSIS — S49.91XA ARM INJURY, RIGHT, INITIAL ENCOUNTER: ICD-10-CM

## 2023-05-17 DIAGNOSIS — M79.601 RIGHT ARM PAIN: ICD-10-CM

## 2023-05-17 PROCEDURE — 99999 PR PBB SHADOW E&M-EST. PATIENT-LVL IV: CPT | Mod: PBBFAC,,, | Performed by: NURSE PRACTITIONER

## 2023-05-17 PROCEDURE — 3075F SYST BP GE 130 - 139MM HG: CPT | Mod: CPTII,S$GLB,, | Performed by: NURSE PRACTITIONER

## 2023-05-17 PROCEDURE — 1159F PR MEDICATION LIST DOCUMENTED IN MEDICAL RECORD: ICD-10-PCS | Mod: CPTII,S$GLB,, | Performed by: NURSE PRACTITIONER

## 2023-05-17 PROCEDURE — 1159F MED LIST DOCD IN RCRD: CPT | Mod: CPTII,S$GLB,, | Performed by: NURSE PRACTITIONER

## 2023-05-17 PROCEDURE — 99213 PR OFFICE/OUTPT VISIT, EST, LEVL III, 20-29 MIN: ICD-10-PCS | Mod: S$GLB,,, | Performed by: NURSE PRACTITIONER

## 2023-05-17 PROCEDURE — 1160F PR REVIEW ALL MEDS BY PRESCRIBER/CLIN PHARMACIST DOCUMENTED: ICD-10-PCS | Mod: CPTII,S$GLB,, | Performed by: NURSE PRACTITIONER

## 2023-05-17 PROCEDURE — 1160F RVW MEDS BY RX/DR IN RCRD: CPT | Mod: CPTII,S$GLB,, | Performed by: NURSE PRACTITIONER

## 2023-05-17 PROCEDURE — 3078F PR MOST RECENT DIASTOLIC BLOOD PRESSURE < 80 MM HG: ICD-10-PCS | Mod: CPTII,S$GLB,, | Performed by: NURSE PRACTITIONER

## 2023-05-17 PROCEDURE — 3075F PR MOST RECENT SYSTOLIC BLOOD PRESS GE 130-139MM HG: ICD-10-PCS | Mod: CPTII,S$GLB,, | Performed by: NURSE PRACTITIONER

## 2023-05-17 PROCEDURE — 3008F BODY MASS INDEX DOCD: CPT | Mod: CPTII,S$GLB,, | Performed by: NURSE PRACTITIONER

## 2023-05-17 PROCEDURE — 3078F DIAST BP <80 MM HG: CPT | Mod: CPTII,S$GLB,, | Performed by: NURSE PRACTITIONER

## 2023-05-17 PROCEDURE — 99213 OFFICE O/P EST LOW 20 MIN: CPT | Mod: S$GLB,,, | Performed by: NURSE PRACTITIONER

## 2023-05-17 PROCEDURE — 99999 PR PBB SHADOW E&M-EST. PATIENT-LVL IV: ICD-10-PCS | Mod: PBBFAC,,, | Performed by: NURSE PRACTITIONER

## 2023-05-17 PROCEDURE — 3008F PR BODY MASS INDEX (BMI) DOCUMENTED: ICD-10-PCS | Mod: CPTII,S$GLB,, | Performed by: NURSE PRACTITIONER

## 2023-05-17 NOTE — PROGRESS NOTES
"Subjective:       Patient ID: Crystal Cordero is a 32 y.o. female.    Chief Complaint: Arm Pain    Patient here with arm pain  2 weeks ago was doing upper body at the gym  She states she felt a sharp pain to left upper arm; there ended up being bruising and pain gradually improved  This past Monday at the gym was doing upper body also and felt the same pain to the right arm  There is bruising and she feels that the arm is swollen  She feels there is pain with range of motion and certain movements especially arm extension  The pain starts to the right chest at the base of the collar bone and extends to the arm    Arm Pain   Pertinent negatives include no chest pain.     /76   Pulse 72   Temp 98.1 °F (36.7 °C)   Ht 5' 5" (1.651 m)   Wt 81.2 kg (179 lb 0.2 oz)   LMP 04/28/2023   SpO2 99%   BMI 29.79 kg/m²     Review of Systems   Constitutional:  Positive for activity change. Negative for appetite change, chills, diaphoresis, fatigue, fever and unexpected weight change.   HENT: Negative.     Respiratory:  Negative for cough and shortness of breath.    Cardiovascular:  Negative for chest pain, palpitations and leg swelling.   Gastrointestinal: Negative.    Genitourinary: Negative.    Musculoskeletal:  Positive for arthralgias and myalgias.   Skin:  Positive for color change. Negative for pallor, rash and wound.   Allergic/Immunologic: Negative for immunocompromised state.   Neurological: Negative.  Negative for dizziness and facial asymmetry.   Hematological:  Negative for adenopathy. Does not bruise/bleed easily.   Psychiatric/Behavioral:  Negative for agitation, behavioral problems and confusion.      Objective:      Physical Exam  Constitutional:       General: She is not in acute distress.     Appearance: Normal appearance. She is well-developed. She is not diaphoretic.   HENT:      Head: Normocephalic and atraumatic.      Right Ear: External ear normal.      Left Ear: External ear normal.   Eyes:    "   General: No scleral icterus.        Right eye: No discharge.         Left eye: No discharge.      Conjunctiva/sclera: Conjunctivae normal.   Pulmonary:      Effort: Pulmonary effort is normal. No tachypnea, accessory muscle usage or respiratory distress.      Breath sounds: No stridor.   Musculoskeletal:      Cervical back: Normal range of motion and neck supple.      Comments: The right upper arm with generalized pain extending to the arm and chest area. There is bruising to the left forearm. Nv intact. 2+ radial pulses.  The left arm with full range of motion, no bruising or swelling   Skin:     Findings: No rash.   Neurological:      Mental Status: She is alert. She is not disoriented.   Psychiatric:         Attention and Perception: She is attentive.         Mood and Affect: Mood normal. Mood is not anxious or depressed. Affect is not labile, blunt, angry or inappropriate.         Speech: Speech normal.         Behavior: Behavior normal.         Thought Content: Thought content normal.         Judgment: Judgment normal.       Assessment:       1. Swelling of joint of upper arm, right    2. Right arm pain    3. Arm injury, right, initial encounter        Plan:       Crystal was seen today for arm pain.    Diagnoses and all orders for this visit:    Swelling of joint of upper arm, right  -     Ambulatory referral/consult to Orthopedics; Future    Right arm pain  -     Ambulatory referral/consult to Orthopedics; Future    Arm injury, right, initial encounter  -     Ambulatory referral/consult to Orthopedics; Future      Recommend sports med eval  Supportive care advised, heating pad, nsaids prn

## 2023-05-17 NOTE — TELEPHONE ENCOUNTER
Pt states that she was doing upper body workout on Monday. Now c/o swelling and bruising to entire right arm with pain 7/10. Pt states that she noticed bruising to entire arm and has not injured it. Pt also states that veins are popping out of right arm. Reports that this happened to left arm 2 weeks ago and she took a break from working out. Also c/o SOB with walking up stairs. Care advise to be seen today per protocol with provider approval. Advised if haven't heard from provider within 30 mins to go to UC/ED. Verbalized understanding. Encounter routed to provider.        Reason for Disposition   Entire arm is swollen    Additional Information   Negative: Shock suspected (e.g., cold/pale/clammy skin, too weak to stand, low BP, rapid pulse)   Negative: Similar pain previously and it was from 'heart attack'   Negative: Similar pain previously from 'angina' and not relieved by nitroglycerin   Negative: Sounds like a life-threatening emergency to the triager   Negative: Difficulty breathing or unusual sweating (e.g., sweating without exertion)   Negative: Chest pain lasting longer than 5 minutes   Negative: Age > 40 and no obvious cause for pain, pain still present even when not moving the arm   Negative: Fever and red area (or area very tender to touch)   Negative: Swollen joint and fever    Protocols used: Arm Pain-A-[place]

## 2023-05-22 ENCOUNTER — HOSPITAL ENCOUNTER (OUTPATIENT)
Dept: RADIOLOGY | Facility: HOSPITAL | Age: 33
Discharge: HOME | End: 2023-05-22
Attending: ORTHOPAEDIC SURGERY
Payer: COMMERCIAL

## 2023-05-22 ENCOUNTER — OFFICE VISIT (OUTPATIENT)
Dept: SPORTS MEDICINE | Facility: CLINIC | Age: 33
End: 2023-05-22
Payer: COMMERCIAL

## 2023-05-22 DIAGNOSIS — M25.522 PAIN OF BOTH ELBOWS: ICD-10-CM

## 2023-05-22 DIAGNOSIS — M54.2 NECK PAIN: ICD-10-CM

## 2023-05-22 DIAGNOSIS — G54.0 THORACIC OUTLET SYNDROME: Primary | ICD-10-CM

## 2023-05-22 DIAGNOSIS — M79.601 RIGHT ARM PAIN: ICD-10-CM

## 2023-05-22 DIAGNOSIS — S49.91XA ARM INJURY, RIGHT, INITIAL ENCOUNTER: ICD-10-CM

## 2023-05-22 DIAGNOSIS — M25.521 PAIN OF BOTH ELBOWS: ICD-10-CM

## 2023-05-22 DIAGNOSIS — M25.421 SWELLING OF JOINT OF UPPER ARM, RIGHT: ICD-10-CM

## 2023-05-22 PROCEDURE — 73080 X-RAY EXAM OF ELBOW: CPT | Mod: TC,50

## 2023-05-22 PROCEDURE — 1159F PR MEDICATION LIST DOCUMENTED IN MEDICAL RECORD: ICD-10-PCS | Mod: CPTII,S$GLB,, | Performed by: ORTHOPAEDIC SURGERY

## 2023-05-22 PROCEDURE — 72040 X-RAY EXAM NECK SPINE 2-3 VW: CPT | Mod: TC

## 2023-05-22 PROCEDURE — 72040 XR CERVICAL SPINE AP LATERAL: ICD-10-PCS | Mod: 26,,, | Performed by: RADIOLOGY

## 2023-05-22 PROCEDURE — 99999 PR PBB SHADOW E&M-EST. PATIENT-LVL IV: ICD-10-PCS | Mod: PBBFAC,,, | Performed by: ORTHOPAEDIC SURGERY

## 2023-05-22 PROCEDURE — 99205 OFFICE O/P NEW HI 60 MIN: CPT | Mod: S$GLB,,, | Performed by: ORTHOPAEDIC SURGERY

## 2023-05-22 PROCEDURE — 73030 X-RAY EXAM OF SHOULDER: CPT | Mod: 26,,, | Performed by: RADIOLOGY

## 2023-05-22 PROCEDURE — 73030 XR SHOULDER COMPLETE 2 OR MORE VIEWS BILATERAL: ICD-10-PCS | Mod: 26,,, | Performed by: RADIOLOGY

## 2023-05-22 PROCEDURE — 73030 X-RAY EXAM OF SHOULDER: CPT | Mod: TC,50

## 2023-05-22 PROCEDURE — 99205 PR OFFICE/OUTPT VISIT, NEW, LEVL V, 60-74 MIN: ICD-10-PCS | Mod: S$GLB,,, | Performed by: ORTHOPAEDIC SURGERY

## 2023-05-22 PROCEDURE — 1159F MED LIST DOCD IN RCRD: CPT | Mod: CPTII,S$GLB,, | Performed by: ORTHOPAEDIC SURGERY

## 2023-05-22 PROCEDURE — 73080 XR ELBOW COMPLETE 3 VIEW BILATERAL: ICD-10-PCS | Mod: 26,,, | Performed by: RADIOLOGY

## 2023-05-22 PROCEDURE — 72040 X-RAY EXAM NECK SPINE 2-3 VW: CPT | Mod: 26,,, | Performed by: RADIOLOGY

## 2023-05-22 PROCEDURE — 73080 X-RAY EXAM OF ELBOW: CPT | Mod: 26,,, | Performed by: RADIOLOGY

## 2023-05-22 PROCEDURE — 99999 PR PBB SHADOW E&M-EST. PATIENT-LVL IV: CPT | Mod: PBBFAC,,, | Performed by: ORTHOPAEDIC SURGERY

## 2023-05-22 NOTE — PATIENT INSTRUCTIONS
Assessment:  Crystal Cordero is a RHD 32 y.o. female    with a chief complaint of Pain of the Right Upper Arm and Pain of the Left Upper Arm    Bilateral shoulder and arm pain - onset 1 month prior with working out    Encounter Diagnoses   Name Primary?    Swelling of joint of upper arm, right     Right arm pain     Arm injury, right, initial encounter     Neck pain     Pain of both elbows     Thoracic outlet syndrome Yes      Plan:  Referral placed to vascular for eval of TOS  Referral placed for physical therapy at [facility] with [facility]  Avoid activity that increases symptoms    Follow-up: after Vascular appointment or sooner if there are any problems between now and then.    Leave Review:   Google: Leave Google Review  Healthgrades: Leave Healthgrades Review    After Hours Number: [phone]

## 2023-05-22 NOTE — PROGRESS NOTES
Patient ID: Crystal Cordero  YOB: 1990  MRN: [id number]    Chief Complaint: Pain of the Right Upper Arm and Pain of the Left Upper Arm    Referred By: RANDAL Reyes    History of Present Illness: Crystal Cordero is a RHD 32 y.o. female    with a chief complaint of Pain of the Right Upper Arm and Pain of the Left Upper Arm    Patient states that she started having pain about 1 week ago in her right arm when she was lifting upper body at the gym. She was unable to continue working out due to pain. She reports that the pain gradually increased as her workout went on until she stopped due to pain. She reports that she had immediate swelling of her right arm while working out. The pain began into her 2 set of lifting upper body.    The left arm pain was onset 1 month when she was completing a upper body workout and started to have pain, swelling, and tightness. She states that the left arm has improved since the initial onset of symptoms.     She reports today that she has intermittent numbness into her right hand on 4th/5th fingers and left hand into 2nd/3rd fingers. She also has bruising on her right arm that has decreased over the last week. She reports use of Aleve as needed for the pain. Her pain is improved with rest.     Pain      Past Medical History:   Past Medical History:   Diagnosis Date    Dyslexia      Past Surgical History:   Procedure Laterality Date    KNEE ARTHROSCOPY W/ ACL RECONSTRUCTION Left     KNEE CARTILAGE SURGERY Left     MEDIAL COLLATERAL LIGAMENT REPAIR, KNEE Left     TONSILLECTOMY       Family History   Problem Relation Age of Onset    Hyperlipidemia Mother     Hypertension Mother     Diabetes Mother     Diabetes Maternal Grandmother     Hypertension Maternal Grandmother     Hyperlipidemia Maternal Grandmother     Diverticulitis Maternal Grandmother     Heart disease Maternal Grandfather     Hypertension Maternal Grandfather     Colon  cancer Maternal Grandfather     Breast cancer Neg Hx      Social History     Socioeconomic History    Marital status: Single    Number of children: 0   Occupational History    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    FERROUS SULFATE 325 (65 FE) MG EC TABLET    Take 325 mg by mouth once daily.    IBUPROFEN (ADVIL,MOTRIN) 200 MG TABLET    Take 200 mg by mouth every 6 (six) hours as needed for Pain.    MULTIVITAMIN (THERAGRAN) PER TABLET    Take 1 tablet by mouth once daily.    OMEGA-3 FATTY ACIDS/FISH OIL (FISH OIL-OMEGA-3 FATTY ACIDS) 300-1,000 MG CAPSULE    Take by mouth once daily.    PANTOPRAZOLE (PROTONIX) 40 MG TABLET    Take 40 mg by mouth once daily.    ZINC SULFATE (ZINC-15) 66 MG TAB    Take 1 tablet by mouth once daily.     Review of patient's allergies indicates:   Allergen Reactions    Hydrocodone-acetaminophen Other (See Comments)     Nausea, dizziness, fainting    Other reaction(s): Fainted     ROS    Physical Exam:   There is no height or weight on file to calculate BMI.  There were no vitals filed for this visit.   GENERAL: Well appearing, appropriate for stated age, no acute distress.  CARDIOVASCULAR: Pulses regular by peripheral palpation.  PULMONARY: Respirations are even and non-labored.  NEURO: Awake, alert, and oriented x 3.  PSYCH: Mood & affect are appropriate.  HEENT: Head is normocephalic and atraumatic.          Right Shoulder Exam     Range of Motion   Active abduction:  120   Forward Flexion:  180   External Rotation 0 degrees:  80   Internal rotation 0 degrees:  T12     Tests & Signs   Kumari test: negative  Impingement: negative  Active Compression Test ([place]'s Sign):  negative    Comments:  Ecchymosis present along medial elbow and lateral proximal forearm  Positive Sandy  Intact extensor pollicis longus, flexor pollicis longus, finger flexion, finger extension, finger abduction and adduction. Sensation intact to radial, median, ulnar, and axillary nerve distributions. Hand warm and well perfused with capillary refill of less than 2 seconds, and palpable distal radial pulses.        Left Shoulder Exam     Range of Motion   Active abduction:  120   Forward Flexion:  180   External Rotation 0 degrees:  80   Internal rotation 0 degrees:  T12     Tests & Signs   Kumari test: negative  Impingement: negative  Active Compression test ([place]'s Sign): negative     Comments:  Positive Sandy  Intact extensor pollicis longus, flexor pollicis longus, finger flexion, finger extension, finger abduction and adduction. Sensation intact to radial, median, ulnar, and axillary nerve distributions. Hand warm and well perfused with capillary refill of less than 2 seconds, and palpable distal radial pulses.        Muscle Strength   Right Upper Extremity   Shoulder Abduction: 5/5   Shoulder Internal Rotation: 5/5   Shoulder External Rotation: 5/5   Supraspinatus: 5/5 (discomfort)   Biceps: 5/5   Left Upper Extremity  Shoulder Abduction: 5/5   Shoulder Internal Rotation: 5/5   Shoulder External Rotation: 5/5   Supraspinatus: 5/5   Biceps: 5/5       Imaging:    X-Ray Cervical Spine AP And Lateral  Narrative: EXAMINATION:  XR CERVICAL SPINE AP LATERAL    CLINICAL HISTORY:  Cervicalgia    TECHNIQUE:  AP, lateral and open mouth views of the cervical spine were performed.    COMPARISON:  None.    FINDINGS:  The vertebral bodies demonstrate a normal height and alignment.  The disc space heights are relatively well maintained with minimal spondylosis seen anteriorly at the C4-5 and to a lesser degree the C5-6 levels.  No significant facet arthropathy identified.  The C1-C2 articulation is within normal  limits.  Impression: As above    Electronically signed by: Sravan Wills DO  Date:    05/22/2023  Time:    08:50  X-Ray Shoulder Complete Bilateral  Narrative: EXAMINATION:  XR SHOULDER COMPLETE 2 OR MORE VIEWS BILATERAL    CLINICAL HISTORY:  Cervicalgia    TECHNIQUE:  3 views of either shoulder, 6 views total    COMPARISON:  None    FINDINGS:  No acute fracture or dislocation.  The joint spaces of the AC joints and glenohumeral joints appear to be relatively well maintained.  Impression: As above    Electronically signed by: Sravan Wills DO  Date:    05/22/2023  Time:    08:50  X-Ray Elbow Complete Bilateral  Narrative: EXAMINATION:  XR ELBOW COMPLETE 3 VIEW BILATERAL    CLINICAL HISTORY:  Cervicalgia    TECHNIQUE:  AP, lateral, and oblique views of bilateral elbow were performed.    COMPARISON:  None    FINDINGS:  No acute fracture or dislocation.  The joint spaces appear to be relatively well maintained.  No joint effusions.  Impression: As above    Electronically signed by: Sravan Wills DO  Date:    05/22/2023  Time:    08:49      Relevant imaging results reviewed and interpreted by me, discussed with the patient and / or family today. No acute findings    Other Tests:       Patient Instructions   Assessment:  Crystal Cordero is a RHD 32 y.o. female    with a chief complaint of Pain of the Right Upper Arm and Pain of the Left Upper Arm    Bilateral shoulder and arm pain - onset 1 month prior with working out    Encounter Diagnoses   Name Primary?    Swelling of joint of upper arm, right     Right arm pain     Arm injury, right, initial encounter     Neck pain     Pain of both elbows     Thoracic outlet syndrome Yes      Plan:  Referral placed to vascular for eval of TOS  Referral placed for physical therapy at [facility] with [facility]  Avoid activity that increases symptoms    Follow-up: after Vascular appointment or sooner if there are any problems between now and then.    Leave  Review:   Google: Leave Google Review  Healthgrades: Leave Healthgrades Review    After Hours Number: [phone]      Provider Note/Medical Decision Making:  This patient has a complex presentation.  She has unusual and vague complaints after working out approximately a month ago which include soreness radiating pain in both arms worse on the right, bruising, and what she describes as prominent vasculature.  I do not appreciate that on exam today.  She has no real point tenderness but is generally sore around her shoulder.  She has no neck pain and provocative testing of her neck does not worsen her symptoms.  She has some pain with Ruso's testing but no detectable instability and she has full range of motion and 5/5 strength of both shoulders.  She is grossly neurovascularly intact distally as well.  She has some recreation of her pain with a Russe test on the right and she has mildly diminished pulses with root test bilaterally.  Considering her complex symptoms we will refer her to vascular to eval this is possible thoracic outlet syndrome and I am also going to get her started with physical therapy.  Patient discussed whether rhabdomyolysis was possible.  She does not endorse a history of over exertion when this happened he is not had that in the past.  History and symptoms right now do not appear to support rhabdo.  Could be nerve compression as well.  We spent over 1 hour in the care and care coordination this patient today on the date of service not otherwise reported including reviewing her imaging, examining her, reviewing outside records.       I discussed worrisome and red flag signs and symptoms with the patient. The patient expressed understanding and agreed to alert me immediately or to go to the emergency room if they experience any of these.   Treatment plan was developed with input from the patient/family, and they expressed understanding and agreement with the plan. All questions were  answered today.          Quinn Faust MD  Orthopaedic Surgery & Sports Medicine       Disclaimer: This note was prepared using a voice recognition system and is likely to have sound alike errors within the text.     I, Vilma St, acted as a scribe for Quinn Faust MD for the duration of this office visit.

## 2023-05-22 NOTE — PROGRESS NOTES
Patient ID: Crystal Cordero  YOB: 1990  MRN: [id number]    Chief Complaint: Pain of the Right Upper Arm and Pain of the Left Upper Arm    Referred By: RANDAL Reyes    History of Present Illness: Crystal Cordero is a RHD 32 y.o. female    with a chief complaint of Pain of the Right Upper Arm and Pain of the Left Upper Arm    Patient states that she started having pain about 1 week ago in her right arm when she was lifting upper body at the gym. She was unable to continue working out due to pain. She reports that the pain gradually increased as her workout went on until she stopped due to pain. She reports that she had immediate swelling of her right arm while working out.     The left arm pain was onset 1 month when she was completing a upper body workout and started to have pain, swelling, and tightness. She states that the left arm has improved since the initial onset of symptoms.     She reports today that she has intermittent numbness into her right hand on 4th/5th fingers and left hand into 2nd/3rd fingers. She also has bruising on her right arm that has decreased over the last week. She reports use of Aleve as needed for the pain.     HPI    Past Medical History:   Past Medical History:   Diagnosis Date    Dyslexia      Past Surgical History:   Procedure Laterality Date    KNEE ARTHROSCOPY W/ ACL RECONSTRUCTION Left     KNEE CARTILAGE SURGERY Left     MEDIAL COLLATERAL LIGAMENT REPAIR, KNEE Left     TONSILLECTOMY       Family History   Problem Relation Age of Onset    Hyperlipidemia Mother     Hypertension Mother     Diabetes Mother     Diabetes Maternal Grandmother     Hypertension Maternal Grandmother     Hyperlipidemia Maternal Grandmother     Diverticulitis Maternal Grandmother     Heart disease Maternal Grandfather     Hypertension Maternal Grandfather     Colon cancer Maternal Grandfather     Breast cancer Neg Hx      Social History     Socioeconomic  History    Marital status: Single    Number of children: 0   Occupational History    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    FERROUS SULFATE 325 (65 FE) MG EC TABLET    Take 325 mg by mouth once daily.    IBUPROFEN (ADVIL,MOTRIN) 200 MG TABLET    Take 200 mg by mouth every 6 (six) hours as needed for Pain.    MULTIVITAMIN (THERAGRAN) PER TABLET    Take 1 tablet by mouth once daily.    OMEGA-3 FATTY ACIDS/FISH OIL (FISH OIL-OMEGA-3 FATTY ACIDS) 300-1,000 MG CAPSULE    Take by mouth once daily.    PANTOPRAZOLE (PROTONIX) 40 MG TABLET    Take 40 mg by mouth once daily.    ZINC SULFATE (ZINC-15) 66 MG TAB    Take 1 tablet by mouth once daily.     Review of patient's allergies indicates:   Allergen Reactions    Hydrocodone-acetaminophen Other (See Comments)     Nausea, dizziness, fainting    Other reaction(s): Fainted     ROS    Physical Exam:   There is no height or weight on file to calculate BMI.  There were no vitals filed for this visit.   GENERAL: Well appearing, appropriate for stated age, no acute distress.  CARDIOVASCULAR: Pulses regular by peripheral palpation.  PULMONARY: Respirations are even and non-labored.  NEURO: Awake, alert, and oriented x 3.  PSYCH: Mood & affect are appropriate.  HEENT: Head is normocephalic and atraumatic.  Ortho/SPM Exam  ***    Imaging:    No image results found.    ***  Relevant imaging results reviewed and interpreted by me, discussed with the patient and / or family today. ***    Other Tests:     ***    There are no Patient Instructions on file for this visit.  Provider Note/Medical Decision Making: ***      I discussed worrisome and red flag signs and symptoms with the patient.  The patient expressed understanding and agreed to alert me immediately or to go to the emergency room if they experience any of these.   Treatment plan was developed with input from the patient/family, and they expressed understanding and agreement with the plan. All questions were answered today.          Quinn Faust MD  Orthopaedic Surgery & Sports Medicine       Disclaimer: This note was prepared using a voice recognition system and is likely to have sound alike errors within the text.

## 2023-05-24 ENCOUNTER — TELEPHONE (OUTPATIENT)
Dept: SPORTS MEDICINE | Facility: CLINIC | Age: 33
End: 2023-05-24
Payer: COMMERCIAL

## 2023-05-24 DIAGNOSIS — G54.0 THORACIC OUTLET SYNDROME: Primary | ICD-10-CM

## 2023-05-24 NOTE — TELEPHONE ENCOUNTER
Returned call from patient. Patient is unable to attend physical therapy at [facility] due to them not taking her insurance. New PT order will be placed for [facility] in [place]. She has appointment with vascular surgery on 6/21, will schedule follow up with Dr. Faust in early July.     ----- Message from Opal Simeon sent at 5/24/2023  1:31 PM CDT -----  Contact: Crystal  .Type:  Patient Returning Call    Who Called:Crystal  Who Left Message for Patient:Nurse  Does the patient know what this is regarding?:Appt  Would the patient rather a call back or a response via [facility]? Call back  Best Call Back Number:[phone]   Additional Information: pt requesting call back

## 2023-06-21 ENCOUNTER — INITIAL CONSULT (OUTPATIENT)
Dept: VASCULAR SURGERY | Facility: CLINIC | Age: 33
End: 2023-06-21
Payer: COMMERCIAL

## 2023-06-21 VITALS
HEART RATE: 63 BPM | BODY MASS INDEX: 29.83 KG/M2 | DIASTOLIC BLOOD PRESSURE: 72 MMHG | SYSTOLIC BLOOD PRESSURE: 107 MMHG | WEIGHT: 179.25 LBS

## 2023-06-21 DIAGNOSIS — G54.0 THORACIC OUTLET SYNDROME: Primary | ICD-10-CM

## 2023-06-21 PROCEDURE — 99204 OFFICE O/P NEW MOD 45 MIN: CPT | Mod: S$GLB,,, | Performed by: SURGERY

## 2023-06-21 PROCEDURE — 99204 PR OFFICE/OUTPT VISIT, NEW, LEVL IV, 45-59 MIN: ICD-10-PCS | Mod: S$GLB,,, | Performed by: SURGERY

## 2023-06-21 PROCEDURE — 99999 PR PBB SHADOW E&M-EST. PATIENT-LVL III: CPT | Mod: PBBFAC,,, | Performed by: SURGERY

## 2023-06-21 PROCEDURE — 99999 PR PBB SHADOW E&M-EST. PATIENT-LVL III: ICD-10-PCS | Mod: PBBFAC,,, | Performed by: SURGERY

## 2023-06-21 NOTE — PROGRESS NOTES
[facility] Vascular Surgery  Congenital Cardiovascular Surgery  Consult Note    Patient Name: Crystal Cordero  MRN: [id number]  Admission Date: (Not on file)  Hospital Length of Stay: 0 days   Attending Physician: No att. providers found  Primary Care Provider: Marito Leonard MD    Patient information was obtained from patient and ER records.     Consults  Subjective:     Chief Complaint right arm swelling and pain    History of Present Illness:  32-year-old female  presents today for evaluation of right upper extremity intermittent edema and pain.  Patient has been followed by Orthopedic surgery there were concerns of thoracic outlet syndrome.  Patient states with periods of overhead activity her right arm sometimes becomes numb and tingling in the fingertips and swollen.  Has been working with physical therapy for the past 2 weeks with improvement in her symptoms.    Current Outpatient Medications   Medication    cyanocobalamin 500 MCG tablet    ferrous sulfate 325 (65 FE) MG EC tablet    ibuprofen (ADVIL,MOTRIN) 200 MG tablet    multivitamin (THERAGRAN) per tablet    omega-3 fatty acids/fish oil (FISH OIL-OMEGA-3 FATTY ACIDS) 300-1,000 mg capsule    zinc sulfate (ZINC-15) 66 mg Tab    pantoprazole (PROTONIX) 40 MG tablet     No current facility-administered medications for this visit.       Review of patient's allergies indicates:   Allergen Reactions    Hydrocodone-acetaminophen Other (See Comments)     Nausea, dizziness, fainting    Other reaction(s): Fainted       Past Medical History:   Diagnosis Date    Dyslexia      Past Surgical History:   Procedure Laterality Date    KNEE ARTHROSCOPY W/ ACL RECONSTRUCTION Left     KNEE CARTILAGE SURGERY Left     MEDIAL COLLATERAL LIGAMENT REPAIR, KNEE Left     TONSILLECTOMY       Family History       Problem Relation (Age of Onset)    Colon cancer Maternal Grandfather    Diabetes Mother, Maternal Grandmother    Diverticulitis Maternal Grandmother     Heart disease Maternal Grandfather    Hyperlipidemia Mother, Maternal Grandmother    Hypertension Mother, Maternal Grandmother, Maternal Grandfather          Tobacco Use    Smoking status: Never    Smokeless tobacco: Never   Substance and Sexual Activity    Alcohol use: Yes     Comment: Occ/social    Drug use: Never    Sexual activity: Not on file     Review of Systems   Constitutional:  Negative for activity change, appetite change, fatigue and fever.   HENT:  Negative for congestion.    Eyes:  Negative for photophobia, redness and visual disturbance.   Respiratory:  Negative for apnea, cough, chest tightness and shortness of breath.    Cardiovascular:  Negative for chest pain and leg swelling.   Gastrointestinal:  Negative for abdominal pain, nausea and vomiting.   Genitourinary:  Negative for difficulty urinating.   Musculoskeletal:  Negative for gait problem and myalgias.   Skin:  Negative for color change, rash and wound.   Neurological:  Negative for syncope, facial asymmetry, speech difficulty, weakness and numbness.   Objective:     Vital Signs (Most Recent):  Pulse: 63 (06/21/23 0840)  BP: 107/72 (06/21/23 0840) Vital Signs (24h Range):  [unfilled]     Weight: 81.3 kg (179 lb 3.7 oz)  Body mass index is 29.83 kg/m².            [unfilled]    Physical Exam  Constitutional:       Appearance: Normal appearance. She is normal weight.   HENT:      Head: Normocephalic and atraumatic.      Mouth/Throat:      Mouth: Mucous membranes are moist.   Eyes:      Extraocular Movements: Extraocular movements intact.      Conjunctiva/sclera: Conjunctivae normal.      Pupils: Pupils are equal, round, and reactive to light.   Neck:      Vascular: No carotid bruit.   Cardiovascular:      Rate and Rhythm: Normal rate and regular rhythm.      Pulses: Normal pulses.           Radial pulses are 2+ on the right side and 2+ on the left side.   Pulmonary:      Effort: Pulmonary effort is normal.      Breath sounds: Normal breath  sounds.   Abdominal:      General: Abdomen is flat. Bowel sounds are normal.      Palpations: Abdomen is soft.   Musculoskeletal:      Cervical back: Neck supple.   Skin:     General: Skin is warm.      Capillary Refill: Capillary refill takes less than 2 seconds.   Neurological:      General: No focal deficit present.      Mental Status: She is alert and oriented to person, place, and time. Mental status is at baseline.      Cranial Nerves: No cranial nerve deficit.      Sensory: No sensory deficit.      Motor: No weakness.   Psychiatric:         Mood and Affect: Mood normal.         Behavior: Behavior normal.       Significant Labs:  I have reviewed all pertinent lab results within the past 24 hours.    Significant Diagnostics:  I have reviewed all pertinent imaging results/findings within the past 24 hours.  CXR: I have reviewed all pertinent results/findings within the past 24 hours and my personal findings are:  No evidence of cervical rib    Assessment/Plan:     There are no hospital problems to display for this patient.      Right upper extremity symptoms seem consistent with a venous and possibly neurogenic thoracic outlet syndrome.  Patient instructed to continue physical therapy at this time as symptoms are improving.  Would like to proceed with conservative management only at this time.  If symptoms do worsen patient may ultimately require a right 1st rib resection    Will have patient follow up in 6 months    Thank you for your consult. I will follow-up with patient. Please contact us if you have any additional questions.    Kyle Vang IV, MD  Vascular Surgery  [facility] Vascular Surgery

## 2023-06-22 ENCOUNTER — PATIENT MESSAGE (OUTPATIENT)
Dept: SPORTS MEDICINE | Facility: CLINIC | Age: 33
End: 2023-06-22
Payer: COMMERCIAL

## 2023-07-13 ENCOUNTER — TELEPHONE (OUTPATIENT)
Dept: SPORTS MEDICINE | Facility: CLINIC | Age: 33
End: 2023-07-13
Payer: COMMERCIAL

## 2023-07-13 NOTE — TELEPHONE ENCOUNTER
Called patient and left voicemail to reschedule appointment. Left phone number to call back or offered to reschedule her through [facility].

## 2023-08-14 ENCOUNTER — OFFICE VISIT (OUTPATIENT)
Dept: URGENT CARE | Facility: CLINIC | Age: 33
End: 2023-08-14
Payer: COMMERCIAL

## 2023-08-14 VITALS
RESPIRATION RATE: 18 BRPM | TEMPERATURE: 97 F | BODY MASS INDEX: 29.82 KG/M2 | HEIGHT: 65 IN | OXYGEN SATURATION: 100 % | SYSTOLIC BLOOD PRESSURE: 129 MMHG | DIASTOLIC BLOOD PRESSURE: 61 MMHG | WEIGHT: 179 LBS | HEART RATE: 78 BPM

## 2023-08-14 DIAGNOSIS — R09.81 NASAL CONGESTION: ICD-10-CM

## 2023-08-14 DIAGNOSIS — R50.9 SUBJECTIVE FEVER: ICD-10-CM

## 2023-08-14 DIAGNOSIS — J01.90 ACUTE BACTERIAL SINUSITIS: Primary | ICD-10-CM

## 2023-08-14 DIAGNOSIS — B96.89 ACUTE BACTERIAL SINUSITIS: Primary | ICD-10-CM

## 2023-08-14 LAB
CTP QC/QA: YES
SARS-COV-2 AG RESP QL IA.RAPID: NEGATIVE

## 2023-08-14 PROCEDURE — 99213 PR OFFICE/OUTPT VISIT, EST, LEVL III, 20-29 MIN: ICD-10-PCS | Mod: S$GLB,,,

## 2023-08-14 PROCEDURE — 99213 OFFICE O/P EST LOW 20 MIN: CPT | Mod: S$GLB,,,

## 2023-08-14 PROCEDURE — 87811 SARS-COV-2 COVID19 W/OPTIC: CPT | Mod: QW,S$GLB,,

## 2023-08-14 PROCEDURE — 87811 SARS CORONAVIRUS 2 ANTIGEN POCT, MANUAL READ: ICD-10-PCS | Mod: QW,S$GLB,,

## 2023-08-14 RX ORDER — AMOXICILLIN AND CLAVULANATE POTASSIUM 875; 125 MG/1; MG/1
1 TABLET, FILM COATED ORAL 2 TIMES DAILY
Qty: 14 TABLET | Refills: 0 | Status: SHIPPED | OUTPATIENT
Start: 2023-08-14 | End: 2023-08-21

## 2023-08-14 NOTE — PATIENT INSTRUCTIONS
PLEASE READ YOUR DISCHARGE INSTRUCTIONS ENTIRELY AS IT CONTAINS IMPORTANT INFORMATION.    - Take full course of antibiotics as prescribed  - Please drink plenty of fluids.  - Please get plenty of rest.  - You can take plain Mucinex (guaifenesin) 1200 mg twice a day to help loosen mucous.   - Use over the counter Flonase as directed  Please return here or go to the Emergency Department for any concerns or worsening of condition.  - Please take an over the counter antihistamine medication (Allegra/Claritin/Zyrtec/Xyzal) of your choice as directed. These are antihistamines that can help with runny nose, nasal congestion, sneezing, and helps to dry up post-nasal drip, which usually causes sore throat and cough.    -If you do NOT have high blood pressure, you may use a decongestant form (D)  of this medication (ie. Claritin- D, zyrtec-D, allegra-D, Mucinex-D) or if you do not take the D form, you can take sudafed (pseudoephedrine) over the counter, which is a decongestant. Do NOT take two decongestant (D) medications at the same time (such as mucinex-D and claritin-D or plain sudafed and claritin D). Dextromethorphan (DM) is a cough suppressant over the counter (ie. mucinex DM, robitussin, delsym; dayquil/nyquil has DM as well.)    If you do have Hypertension or palpitations, it is safe to take Coricidin HBP for relief of sinus symptoms.    - If not allergic, please take over the counter Tylenol (Acetaminophen) and/or Motrin (Ibuprofen) as directed for control of pain and/or fever.  Avoid tylenol if you have a history of liver disease. Do not take ibuprofen if you have a history of GI bleeding, kidney disease, or if you take blood thinners.  Please follow up with your primary care doctor or specialist as needed.    -IF YOU RECEIVED PRESCRIPTION COUGH SUPPRESSANTS: Take prescription cough meds (pills) as prescribed; take prescription cough syrup at night as needed for cough.  Do not take both the prescribed cough pills  and syrup at the same time or within 6 hours of each other.  Do not take the cough syrup with any other sedative medication as it can can cause drowsiness. Do not operate any heavy machinery, drink or drive while taking the cough syrup.    Try taking half a dose first of the cough syrup to see how it affects you.     Sore throat recommendations: Warm fluids, warm salt water gargles, throat lozenges, tea, honey, soup, rest, hydration.    Use over the counter flonase: one spray each nostril twice daily OR two sprays each nostril once daily for sinus congestion and postnasal drip. This is a steroid nasal spray that works locally over time to decrease the inflammation in your nose/sinuses and help with allergic symptoms. This is not an quick- relief spray like afrin, but it works well if used daily.  Discontinue if you develop nose bleed    Sinus rinses DO NOT USE TAP WATER, if you must, water must be a rolling boil for 1 minute, let it cool, then use.  May use distilled water, or over the counter nasal saline rinses.  Vics vapor rub in shower to help open nasal passages.  May use nasal gel to keep passages moisturized.  May use Nasal saline sprays during the day for added relief of congestion.   For those who go to the gym, please do not use the sauna or steam room now to clear sinuses.    If you  smoke, please stop smoking.      Please return or see your primary care doctor if you develop new or worsening symptoms.     Please arrange follow up with your primary medical clinic as soon as possible. You must understand that you've received an Urgent Care treatment only and that you may be released before all of your medical problems are known or treated. You, the patient, will arrange for follow up as instructed. If your symptoms worsen or fail to improve you should go to the Emergency Room.

## 2023-08-14 NOTE — PROGRESS NOTES
"Subjective:      Patient ID: Crystal Cordero is a 32 y.o. female.    Vitals:  height is 5' 5" (1.651 m) and weight is 81.2 kg (179 lb). Her tympanic temperature is 97.1 °F (36.2 °C). Her blood pressure is 129/61 and her pulse is 78. Her respiration is 18 and oxygen saturation is 100%.     Chief Complaint: Fever (101F)    33 yo female Patient presents with fever, nasal congestion, sinus pain, mild scratchy sore throat, and fatigue. Symptoms started 4 days ago, cough presented first. Has tried dayquil/nyquil without relief. Denies known sick contacts. Denies trouble swallowing, chest pain, shortness of breath, ear pain. "I usually get 1 sinus infection per year and I think this is it." Allergic to Norco.      Fever   This is a new problem. The current episode started in the past 7 days (2). The problem has been gradually worsening. The maximum temperature noted was 101 to 101.9 F. The temperature was taken using an oral thermometer. Associated symptoms include congestion (nasal), coughing, headaches (sinus pressure), muscle aches and a sore throat (mild scratchy). Pertinent negatives include no abdominal pain, chest pain, diarrhea, ear pain, nausea, rash, sleepiness, urinary pain, vomiting or wheezing. Treatments tried: dayquil, nyquil. The treatment provided no relief.       Constitution: Positive for fever. Negative for chills and generalized weakness.   HENT:  Positive for congestion (nasal), sinus pain, sinus pressure and sore throat (mild scratchy). Negative for ear pain, ear discharge, trouble swallowing and voice change.    Neck: Negative for neck pain and neck stiffness.   Cardiovascular:  Negative for chest pain.   Eyes:  Negative for eye discharge, eye itching and eye redness.   Respiratory:  Positive for cough. Negative for sputum production and wheezing.    Gastrointestinal:  Negative for abdominal pain, nausea, vomiting and diarrhea.   Genitourinary:  Negative for dysuria.   Skin:  Negative for rash. "   Allergic/Immunologic: Negative for sneezing.   Neurological:  Positive for headaches (sinus pressure). Negative for dizziness, light-headedness, speech difficulty, numbness and tingling.      Objective:     Vitals:    08/14/23 1154   BP: 129/61   Pulse: 78   Resp: 18   Temp: 97.1 °F (36.2 °C)       Physical Exam   Constitutional: She is oriented to person, place, and time. She appears well-developed. She is cooperative.  Non-toxic appearance. She does not appear ill. No distress.   HENT:   Head: Normocephalic and atraumatic.   Ears:   Right Ear: Hearing, tympanic membrane, external ear and ear canal normal. No cerumen not present. Tympanic membrane is not erythematous and not bulging. No middle ear effusion. impacted cerumen  Left Ear: Hearing, tympanic membrane, external ear and ear canal normal. No cerumen not present. Tympanic membrane is not erythematous and not bulging.  No middle ear effusion. impacted cerumen  Nose: No mucosal edema, rhinorrhea or nasal deformity. No epistaxis. Right sinus exhibits maxillary sinus tenderness. Right sinus exhibits no frontal sinus tenderness. Left sinus exhibits maxillary sinus tenderness. Left sinus exhibits no frontal sinus tenderness.   Mouth/Throat: Uvula is midline, oropharynx is clear and moist and mucous membranes are normal. Mucous membranes are moist. No trismus in the jaw. Normal dentition. No uvula swelling. No oropharyngeal exudate, posterior oropharyngeal edema, posterior oropharyngeal erythema or cobblestoning. No tonsillar exudate.   Eyes: Conjunctivae and lids are normal. Pupils are equal, round, and reactive to light. Right eye exhibits no discharge. Left eye exhibits no discharge. No scleral icterus. Extraocular movement intact gaze aligned appropriately periorbital hyperpigmentation  Neck: Trachea normal and phonation normal. Neck supple. No edema present. No erythema present. No neck rigidity present. No decreased range of motion present. No pain with  movement present.   Cardiovascular: Normal rate, regular rhythm, normal heart sounds and normal pulses.   Pulmonary/Chest: Effort normal and breath sounds normal. No accessory muscle usage. No respiratory distress. She has no decreased breath sounds. She has no wheezes. She has no rhonchi. She has no rales.   Abdominal: Normal appearance.   Musculoskeletal: Normal range of motion.         General: No deformity. Normal range of motion.   Lymphadenopathy:     She has no cervical adenopathy.   Neurological: She is alert and oriented to person, place, and time. She has intact cranial nerves (2-12). She displays no weakness and facial symmetry. She exhibits normal muscle tone. Gait normal. Coordination and gait normal.   Skin: Skin is warm, dry, intact, not diaphoretic, not pale and no rash.   Psychiatric: Her speech is normal and behavior is normal. Judgment and thought content normal.   Nursing note and vitals reviewed.      Assessment:     1. Acute bacterial sinusitis    2. Subjective fever    3. Nasal congestion        Results for orders placed or performed in visit on 08/14/23   SARS Coronavirus 2 Antigen, POCT Manual Read   Result Value Ref Range    SARS Coronavirus 2 Antigen Negative Negative     Acceptable Yes        Plan:       Acute bacterial sinusitis  -     amoxicillin-clavulanate 875-125mg (AUGMENTIN) 875-125 mg per tablet; Take 1 tablet by mouth 2 (two) times a day. for 7 days  Dispense: 14 tablet; Refill: 0    Subjective fever  -     SARS Coronavirus 2 Antigen, POCT Manual Read    Nasal congestion        Patient Instructions   PLEASE READ YOUR DISCHARGE INSTRUCTIONS ENTIRELY AS IT CONTAINS IMPORTANT INFORMATION.    - Take full course of antibiotics as prescribed  - Please drink plenty of fluids.  - Please get plenty of rest.  - You can take plain Mucinex (guaifenesin) 1200 mg twice a day to help loosen mucous.   - Use over the counter Flonase as directed  Please return here or go to the  Emergency Department for any concerns or worsening of condition.  - Please take an over the counter antihistamine medication (Allegra/Claritin/Zyrtec/Xyzal) of your choice as directed. These are antihistamines that can help with runny nose, nasal congestion, sneezing, and helps to dry up post-nasal drip, which usually causes sore throat and cough.    -If you do NOT have high blood pressure, you may use a decongestant form (D)  of this medication (ie. Claritin- D, zyrtec-D, allegra-D, Mucinex-D) or if you do not take the D form, you can take sudafed (pseudoephedrine) over the counter, which is a decongestant. Do NOT take two decongestant (D) medications at the same time (such as mucinex-D and claritin-D or plain sudafed and claritin D). Dextromethorphan (DM) is a cough suppressant over the counter (ie. mucinex DM, robitussin, delsym; dayquil/nyquil has DM as well.)    If you do have Hypertension or palpitations, it is safe to take Coricidin HBP for relief of sinus symptoms.    - If not allergic, please take over the counter Tylenol (Acetaminophen) and/or Motrin (Ibuprofen) as directed for control of pain and/or fever.  Avoid tylenol if you have a history of liver disease. Do not take ibuprofen if you have a history of GI bleeding, kidney disease, or if you take blood thinners.  Please follow up with your primary care doctor or specialist as needed.    -IF YOU RECEIVED PRESCRIPTION COUGH SUPPRESSANTS: Take prescription cough meds (pills) as prescribed; take prescription cough syrup at night as needed for cough.  Do not take both the prescribed cough pills and syrup at the same time or within 6 hours of each other.  Do not take the cough syrup with any other sedative medication as it can can cause drowsiness. Do not operate any heavy machinery, drink or drive while taking the cough syrup.    Try taking half a dose first of the cough syrup to see how it affects you.     Sore throat recommendations: Warm fluids, warm  salt water gargles, throat lozenges, tea, honey, soup, rest, hydration.    Use over the counter flonase: one spray each nostril twice daily OR two sprays each nostril once daily for sinus congestion and postnasal drip. This is a steroid nasal spray that works locally over time to decrease the inflammation in your nose/sinuses and help with allergic symptoms. This is not an quick- relief spray like afrin, but it works well if used daily.  Discontinue if you develop nose bleed    Sinus rinses DO NOT USE TAP WATER, if you must, water must be a rolling boil for 1 minute, let it cool, then use.  May use distilled water, or over the counter nasal saline rinses.  Vics vapor rub in shower to help open nasal passages.  May use nasal gel to keep passages moisturized.  May use Nasal saline sprays during the day for added relief of congestion.   For those who go to the gym, please do not use the sauna or steam room now to clear sinuses.    If you  smoke, please stop smoking.      Please return or see your primary care doctor if you develop new or worsening symptoms.     Please arrange follow up with your primary medical clinic as soon as possible. You must understand that you've received an Urgent Care treatment only and that you may be released before all of your medical problems are known or treated. You, the patient, will arrange for follow up as instructed. If your symptoms worsen or fail to improve you should go to the Emergency Room.      Medical Decision Making:   History:   Old Medical Records: I decided to obtain old medical records.  Clinical Tests:   Lab Tests: Ordered and Reviewed       <> Summary of Lab: COVID negative  Urgent Care Management:  Reviewed negative COVID-19 virus with patient who verbalized understanding.  Advised patient that her symptoms are indicative of a sinus infection which will be treated with antibiotics  We discussed over-the-counter medications as well as home remedies to help with current  symptoms.  Patient educational handouts also included in discharge paperwork for patient who verbalized understanding agrees with plan of care.  she denies any further questions or concerns at this time.  Patient exits exam room in no acute distress.

## 2023-08-17 ENCOUNTER — TELEPHONE (OUTPATIENT)
Dept: URGENT CARE | Facility: CLINIC | Age: 33
End: 2023-08-17
Payer: COMMERCIAL

## 2024-04-10 ENCOUNTER — OFFICE VISIT (OUTPATIENT)
Dept: URGENT CARE | Facility: CLINIC | Age: 34
End: 2024-04-10
Payer: COMMERCIAL

## 2024-04-10 VITALS
RESPIRATION RATE: 16 BRPM | OXYGEN SATURATION: 98 % | SYSTOLIC BLOOD PRESSURE: 125 MMHG | BODY MASS INDEX: 27.8 KG/M2 | HEIGHT: 66 IN | WEIGHT: 173 LBS | HEART RATE: 67 BPM | TEMPERATURE: 98 F | DIASTOLIC BLOOD PRESSURE: 70 MMHG

## 2024-04-10 DIAGNOSIS — J02.9 SORE THROAT: ICD-10-CM

## 2024-04-10 DIAGNOSIS — J06.9 VIRAL URI: Primary | ICD-10-CM

## 2024-04-10 DIAGNOSIS — J34.89 SINUS PRESSURE: ICD-10-CM

## 2024-04-10 PROCEDURE — 99214 OFFICE O/P EST MOD 30 MIN: CPT | Mod: S$GLB,,, | Performed by: PHYSICIAN ASSISTANT

## 2024-04-10 RX ORDER — METHYLPREDNISOLONE 4 MG/1
TABLET ORAL
Qty: 21 EACH | Refills: 0 | Status: SHIPPED | OUTPATIENT
Start: 2024-04-10 | End: 2024-05-01

## 2024-04-10 NOTE — PROGRESS NOTES
"Subjective:      Patient ID: Crystal Cordero is a 33 y.o. female.    Vitals:  height is 5' 6" (1.676 m) and weight is 78.5 kg (173 lb). Her tympanic temperature is 98.4 °F (36.9 °C). Her blood pressure is 125/70 and her pulse is 67. Her respiration is 16 and oxygen saturation is 98%.     Chief Complaint: Sinus Problem    Patient presents with congestion, sinus pressure, headache, runny nose, cough, watery eyes, sore throat, and popping ears that began Monday. She has been taking Allegra, Advil, and Dayquil. She was at a [employer] boil on Sunday and her aunt was sick.     Sinus Problem  This is a new problem. The current episode started in the past 7 days. The problem has been gradually worsening since onset. There has been no fever. Her pain is at a severity of 5/10. Associated symptoms include congestion, coughing, headaches, sinus pressure and a sore throat. Pertinent negatives include no chills, diaphoresis, ear pain, hoarse voice, neck pain, shortness of breath, sneezing or swollen glands. Past treatments include oral decongestants and acetaminophen.       Constitution: Negative for chills and sweating.   HENT:  Positive for congestion, sinus pressure and sore throat. Negative for ear pain.    Neck: Negative for neck pain.   Respiratory:  Positive for cough. Negative for shortness of breath.    Allergic/Immunologic: Negative for sneezing.   Neurological:  Positive for headaches.      Objective:     Physical Exam   Constitutional: She is oriented to person, place, and time. She appears well-developed.   HENT:   Head: Normocephalic and atraumatic.   Ears:   Right Ear: External ear normal.   Left Ear: External ear normal.   Nose: Nose normal.   Mouth/Throat: Oropharynx is clear and moist.   Eyes: Conjunctivae, EOM and lids are normal.   Neck: Phonation normal. Neck supple.   Cardiovascular: Normal rate.   Pulmonary/Chest: Effort normal. No respiratory distress. She has no wheezes. She has no rhonchi. "   Musculoskeletal: Normal range of motion.         General: Normal range of motion.   Neurological: She is alert and oriented to person, place, and time.   Skin: Skin is warm, dry and intact.   Psychiatric: Her speech is normal and behavior is normal. Judgment and thought content normal.   Nursing note and vitals reviewed.      Assessment:     1. Viral URI    2. Sore throat    3. Sinus pressure        Plan:   VSS. Patient non-toxic appearing. Discussed medication being prescribed.  Advised patient to follow up with PCP as needed.  Patient verbalized understanding, agrees with the plan, and is comfortable with discharge.      Viral URI  -     methylPREDNISolone (MEDROL DOSEPACK) 4 mg tablet; use as directed  Dispense: 21 each; Refill: 0    Sore throat    Sinus pressure  -     dexbrompheniramine-phenylep-DM 2-10-20 mg Tab; Take 1 tablet by mouth every 4 (four) hours as needed.  Dispense: 28 tablet; Refill: 0